# Patient Record
Sex: FEMALE | Race: WHITE | NOT HISPANIC OR LATINO | URBAN - METROPOLITAN AREA
[De-identification: names, ages, dates, MRNs, and addresses within clinical notes are randomized per-mention and may not be internally consistent; named-entity substitution may affect disease eponyms.]

---

## 2019-06-05 PROBLEM — 428283002 HISTORY OF POLYP OF COLON (SITUATION): Status: ACTIVE | Noted: 2019-06-05

## 2023-03-07 ENCOUNTER — OFFICE VISIT (OUTPATIENT)
Dept: URBAN - METROPOLITAN AREA CLINIC 35 | Facility: CLINIC | Age: 63
End: 2023-03-07

## 2023-03-07 NOTE — HPI-SURVEILLANCE COLONOSCOPY
63 y/o female patient presents today for a surveillance colonscopy. Her last colonoscopy was in 2019 with findings of colon polyps. She admits/denies a family history of colon, gastric, or esophageal cancer/polyps. Currently reports - - bowel movements - - with/out strain. Her stools are - - with/out blood, mucus, or melena. She admits/denies any episodes of rectal pain or pruritus ani.

## 2023-04-06 ENCOUNTER — OFFICE VISIT (OUTPATIENT)
Dept: URBAN - METROPOLITAN AREA CLINIC 35 | Facility: CLINIC | Age: 63
End: 2023-04-06
Payer: COMMERCIAL

## 2023-04-06 VITALS
SYSTOLIC BLOOD PRESSURE: 118 MMHG | BODY MASS INDEX: 27.7 KG/M2 | WEIGHT: 187 LBS | HEIGHT: 69 IN | DIASTOLIC BLOOD PRESSURE: 82 MMHG

## 2023-04-06 DIAGNOSIS — Z86.010 PERSONAL HISTORY OF COLONIC POLYPS: ICD-10-CM

## 2023-04-06 DIAGNOSIS — K21.9 GERD WITHOUT ESOPHAGITIS: ICD-10-CM

## 2023-04-06 PROBLEM — 266435005: Status: ACTIVE | Noted: 2023-04-06

## 2023-04-06 PROCEDURE — 99204 OFFICE O/P NEW MOD 45 MIN: CPT | Performed by: PHYSICIAN ASSISTANT

## 2023-04-06 RX ORDER — FAMOTIDINE 10 MG/1
1 TABLET AS NEEDED TABLET, FILM COATED ORAL TWICE A DAY
Status: ACTIVE | COMMUNITY

## 2023-04-06 RX ORDER — SODIUM, POTASSIUM,MAG SULFATES 17.5-3.13G
AS DIRECTED SOLUTION, RECONSTITUTED, ORAL ORAL
Qty: 1 | Refills: 0 | OUTPATIENT
Start: 2023-04-06 | End: 2023-04-08

## 2023-04-06 RX ORDER — SODIUM PICOSULFATE, MAGNESIUM OXIDE, AND ANHYDROUS CITRIC ACID 10; 3.5; 12 MG/160ML; G/160ML; G/160ML
AS DIRECTED LIQUID ORAL
Qty: 1 KIT | Refills: 0 | Status: ON HOLD | COMMUNITY
Start: 2019-06-05

## 2023-04-06 NOTE — HPI-GERD
Pt admtis to GERD and takes Pepcid daily with good control, but takes it a lot.  She has an EGD back in 2018.

## 2023-04-06 NOTE — HPI-SURVEILLANCE COLONOSCOPY
64 y/o female patient presents today for a surveillance colonoscopy. Her last colonoscopy was in 2019. She denies a family history of colon, gastric, or esophageal cancer/polyps. Currently reports 1 bowel movement per day/every other day with ocassional strain. Her stools are formed without blood, mucus, or melena. She denies any episodes of rectal pain or pruritus ani. She admits she occasionally gets anal fissure issues and will have slight blood when she wipes.

## 2023-05-11 ENCOUNTER — CLAIMS CREATED FROM THE CLAIM WINDOW (OUTPATIENT)
Dept: URBAN - METROPOLITAN AREA CLINIC 4 | Facility: CLINIC | Age: 63
End: 2023-05-11
Payer: COMMERCIAL

## 2023-05-11 ENCOUNTER — OFFICE VISIT (OUTPATIENT)
Dept: URBAN - METROPOLITAN AREA SURGERY CENTER 8 | Facility: SURGERY CENTER | Age: 63
End: 2023-05-11
Payer: COMMERCIAL

## 2023-05-11 DIAGNOSIS — Z86.010 ADENOMAS PERSONAL HISTORY OF COLONIC POLYPS: ICD-10-CM

## 2023-05-11 DIAGNOSIS — K63.89 APPENDICITIS EPIPLOICA: ICD-10-CM

## 2023-05-11 DIAGNOSIS — K62.1 ANAL AND RECTAL POLYP: ICD-10-CM

## 2023-05-11 DIAGNOSIS — K63.89 OTHER SPECIFIED DISEASES OF INTESTINE: ICD-10-CM

## 2023-05-11 DIAGNOSIS — D12.5 ADENOMA OF SIGMOID COLON: ICD-10-CM

## 2023-05-11 PROCEDURE — G8907 PT DOC NO EVENTS ON DISCHARG: HCPCS | Performed by: INTERNAL MEDICINE

## 2023-05-11 PROCEDURE — 88305 TISSUE EXAM BY PATHOLOGIST: CPT | Performed by: PATHOLOGY

## 2023-05-11 PROCEDURE — 45385 COLONOSCOPY W/LESION REMOVAL: CPT | Performed by: INTERNAL MEDICINE

## 2023-05-11 PROCEDURE — 45380 COLONOSCOPY AND BIOPSY: CPT | Performed by: INTERNAL MEDICINE

## 2023-06-01 ENCOUNTER — OFFICE VISIT (OUTPATIENT)
Dept: URBAN - METROPOLITAN AREA CLINIC 35 | Facility: CLINIC | Age: 63
End: 2023-06-01
Payer: COMMERCIAL

## 2023-06-01 ENCOUNTER — DASHBOARD ENCOUNTERS (OUTPATIENT)
Age: 63
End: 2023-06-01

## 2023-06-01 VITALS
DIASTOLIC BLOOD PRESSURE: 80 MMHG | BODY MASS INDEX: 27.99 KG/M2 | SYSTOLIC BLOOD PRESSURE: 122 MMHG | HEIGHT: 69 IN | WEIGHT: 189 LBS

## 2023-06-01 DIAGNOSIS — D12.5 ADENOMATOUS POLYP OF SIGMOID COLON: ICD-10-CM

## 2023-06-01 DIAGNOSIS — K64.1 GRADE II HEMORRHOIDS: ICD-10-CM

## 2023-06-01 DIAGNOSIS — K57.90 DIVERTICULOSIS: ICD-10-CM

## 2023-06-01 DIAGNOSIS — K21.9 GERD WITHOUT ESOPHAGITIS: ICD-10-CM

## 2023-06-01 DIAGNOSIS — K63.5 HYPERPLASTIC COLONIC POLYP, UNSPECIFIED PART OF COLON: ICD-10-CM

## 2023-06-01 DIAGNOSIS — Z86.010 PERSONAL HISTORY OF COLONIC POLYPS: ICD-10-CM

## 2023-06-01 PROBLEM — 397881000: Status: ACTIVE | Noted: 2023-06-01

## 2023-06-01 PROCEDURE — 99214 OFFICE O/P EST MOD 30 MIN: CPT | Performed by: PHYSICIAN ASSISTANT

## 2023-06-01 RX ORDER — SODIUM PICOSULFATE, MAGNESIUM OXIDE, AND ANHYDROUS CITRIC ACID 10; 3.5; 12 MG/160ML; G/160ML; G/160ML
AS DIRECTED LIQUID ORAL
Qty: 1 KIT | Refills: 0 | Status: ON HOLD | COMMUNITY
Start: 2019-06-05

## 2023-06-01 RX ORDER — FAMOTIDINE 10 MG/1
1 TABLET AS NEEDED TABLET, FILM COATED ORAL TWICE A DAY
Status: ACTIVE | COMMUNITY

## 2023-06-01 NOTE — HPI-COLONOSCOPY FOLLOWUP
63 year old female patient presents today for a follow up from her colonoscopy. She admits/denies any complications after her procedure. Currently reports 1 bowel movement per day/every other day with ocassional strain. Her stools are formed without blood, mucus, or melena. She denies any episodes of rectal pain or pruritus ani.  Colonoscopy report shows: - Non-bleeding internal hemorrhoids. - One 2 to 3 mm hyperplastic polyp in the transverse colon. - One 3 mm hyperplastic polyp at the splenic flexure. - One 5 mm hyperplastic polyp in the descending colon. - Four 1 to 3 mm tubular adenomas in the sigmoid colon. - Six 3 to 5 mm hyperplastic polyps in the sigmoid colon. - Six 2 to 4 mm hyperplastic polyps in the rectum. - Diverticulosis in the sigmoid colon, in the descending colon, at the hepatic flexure and in the ascending colon.